# Patient Record
Sex: MALE | Race: WHITE | URBAN - METROPOLITAN AREA
[De-identification: names, ages, dates, MRNs, and addresses within clinical notes are randomized per-mention and may not be internally consistent; named-entity substitution may affect disease eponyms.]

---

## 2022-02-15 ENCOUNTER — OFFICE VISIT (OUTPATIENT)
Dept: URBAN - METROPOLITAN AREA CLINIC 23 | Facility: CLINIC | Age: 52
End: 2022-02-15
Payer: COMMERCIAL

## 2022-02-15 DIAGNOSIS — H53.123 TRANSIENT VISUAL LOSS, BILATERAL: Primary | ICD-10-CM

## 2022-02-15 PROCEDURE — 99203 OFFICE O/P NEW LOW 30 MIN: CPT | Performed by: OPHTHALMOLOGY

## 2022-02-15 ASSESSMENT — INTRAOCULAR PRESSURE
OD: 14
OS: 14

## 2022-02-15 ASSESSMENT — KERATOMETRY
OS: 42.38
OD: 42.63

## 2022-02-15 NOTE — IMPRESSION/PLAN
Impression: Transient visual loss, bilateral: H53.123. Condition: will continue to monitor. Plan: Discussed diagnosis in detail with patient. No treatment is required at this time. Advised systemic workup. Patient recently moved to [place] in December and has an upcoming appointment with Primary Care Physician. Recommend patient scheduling consultation with Cardiologist. Will continue to monitor. Call if [address] worsens.